# Patient Record
Sex: MALE | Race: WHITE | Employment: FULL TIME | URBAN - NONMETROPOLITAN AREA
[De-identification: names, ages, dates, MRNs, and addresses within clinical notes are randomized per-mention and may not be internally consistent; named-entity substitution may affect disease eponyms.]

---

## 2024-05-17 ENCOUNTER — APPOINTMENT (OUTPATIENT)
Dept: CARDIOLOGY | Facility: HOSPITAL | Age: 59
End: 2024-05-17
Payer: COMMERCIAL

## 2024-05-17 ENCOUNTER — HOSPITAL ENCOUNTER (EMERGENCY)
Facility: HOSPITAL | Age: 59
Discharge: HOME | End: 2024-05-17
Attending: EMERGENCY MEDICINE
Payer: COMMERCIAL

## 2024-05-17 ENCOUNTER — APPOINTMENT (OUTPATIENT)
Dept: RADIOLOGY | Facility: HOSPITAL | Age: 59
End: 2024-05-17
Payer: COMMERCIAL

## 2024-05-17 VITALS
TEMPERATURE: 97.7 F | HEART RATE: 76 BPM | RESPIRATION RATE: 13 BRPM | BODY MASS INDEX: 32.44 KG/M2 | WEIGHT: 219 LBS | OXYGEN SATURATION: 99 % | HEIGHT: 69 IN | DIASTOLIC BLOOD PRESSURE: 81 MMHG | SYSTOLIC BLOOD PRESSURE: 149 MMHG

## 2024-05-17 DIAGNOSIS — R51.9 ACUTE NONINTRACTABLE HEADACHE, UNSPECIFIED HEADACHE TYPE: ICD-10-CM

## 2024-05-17 DIAGNOSIS — R41.0 CONFUSION: Primary | ICD-10-CM

## 2024-05-17 LAB
ALBUMIN SERPL BCP-MCNC: 4.7 G/DL (ref 3.4–5)
ALP SERPL-CCNC: 78 U/L (ref 33–136)
ALT SERPL W P-5'-P-CCNC: 31 U/L (ref 10–52)
ANION GAP SERPL CALC-SCNC: 12 MMOL/L (ref 10–20)
AST SERPL W P-5'-P-CCNC: 26 U/L (ref 9–39)
BASOPHILS # BLD AUTO: 0.01 X10*3/UL (ref 0–0.1)
BASOPHILS NFR BLD AUTO: 0.2 %
BILIRUB SERPL-MCNC: 0.6 MG/DL (ref 0–1.2)
BUN SERPL-MCNC: 13 MG/DL (ref 6–23)
CALCIUM SERPL-MCNC: 9.6 MG/DL (ref 8.6–10.3)
CARDIAC TROPONIN I PNL SERPL HS: 9 NG/L (ref 0–20)
CHLORIDE SERPL-SCNC: 106 MMOL/L (ref 98–107)
CO2 SERPL-SCNC: 24 MMOL/L (ref 21–32)
CREAT SERPL-MCNC: 0.69 MG/DL (ref 0.5–1.3)
EGFRCR SERPLBLD CKD-EPI 2021: >90 ML/MIN/1.73M*2
EOSINOPHIL # BLD AUTO: 0 X10*3/UL (ref 0–0.7)
EOSINOPHIL NFR BLD AUTO: 0 %
ERYTHROCYTE [DISTWIDTH] IN BLOOD BY AUTOMATED COUNT: 12.6 % (ref 11.5–14.5)
GLUCOSE SERPL-MCNC: 117 MG/DL (ref 74–99)
HCT VFR BLD AUTO: 46.1 % (ref 41–52)
HGB BLD-MCNC: 15.6 G/DL (ref 13.5–17.5)
IMM GRANULOCYTES # BLD AUTO: 0.01 X10*3/UL (ref 0–0.7)
IMM GRANULOCYTES NFR BLD AUTO: 0.2 % (ref 0–0.9)
LYMPHOCYTES # BLD AUTO: 1.13 X10*3/UL (ref 1.2–4.8)
LYMPHOCYTES NFR BLD AUTO: 24.4 %
MCH RBC QN AUTO: 30.4 PG (ref 26–34)
MCHC RBC AUTO-ENTMCNC: 33.8 G/DL (ref 32–36)
MCV RBC AUTO: 90 FL (ref 80–100)
MONOCYTES # BLD AUTO: 0.38 X10*3/UL (ref 0.1–1)
MONOCYTES NFR BLD AUTO: 8.2 %
NEUTROPHILS # BLD AUTO: 3.1 X10*3/UL (ref 1.2–7.7)
NEUTROPHILS NFR BLD AUTO: 67 %
NRBC BLD-RTO: 0 /100 WBCS (ref 0–0)
PLATELET # BLD AUTO: 177 X10*3/UL (ref 150–450)
POTASSIUM SERPL-SCNC: 4.1 MMOL/L (ref 3.5–5.3)
PROT SERPL-MCNC: 6.9 G/DL (ref 6.4–8.2)
RBC # BLD AUTO: 5.14 X10*6/UL (ref 4.5–5.9)
SODIUM SERPL-SCNC: 138 MMOL/L (ref 136–145)
WBC # BLD AUTO: 4.6 X10*3/UL (ref 4.4–11.3)

## 2024-05-17 PROCEDURE — 36415 COLL VENOUS BLD VENIPUNCTURE: CPT | Performed by: EMERGENCY MEDICINE

## 2024-05-17 PROCEDURE — 84075 ASSAY ALKALINE PHOSPHATASE: CPT | Performed by: EMERGENCY MEDICINE

## 2024-05-17 PROCEDURE — 99285 EMERGENCY DEPT VISIT HI MDM: CPT | Mod: 25

## 2024-05-17 PROCEDURE — 2500000004 HC RX 250 GENERAL PHARMACY W/ HCPCS (ALT 636 FOR OP/ED): Performed by: EMERGENCY MEDICINE

## 2024-05-17 PROCEDURE — 70450 CT HEAD/BRAIN W/O DYE: CPT

## 2024-05-17 PROCEDURE — 96360 HYDRATION IV INFUSION INIT: CPT

## 2024-05-17 PROCEDURE — 85025 COMPLETE CBC W/AUTO DIFF WBC: CPT | Performed by: EMERGENCY MEDICINE

## 2024-05-17 PROCEDURE — 84484 ASSAY OF TROPONIN QUANT: CPT | Performed by: EMERGENCY MEDICINE

## 2024-05-17 PROCEDURE — 93005 ELECTROCARDIOGRAM TRACING: CPT

## 2024-05-17 PROCEDURE — 70450 CT HEAD/BRAIN W/O DYE: CPT | Performed by: RADIOLOGY

## 2024-05-17 RX ADMIN — SODIUM CHLORIDE 1000 ML: 9 INJECTION, SOLUTION INTRAVENOUS at 10:25

## 2024-05-17 ASSESSMENT — PAIN SCALES - GENERAL: PAINLEVEL_OUTOF10: 0 - NO PAIN

## 2024-05-17 ASSESSMENT — COLUMBIA-SUICIDE SEVERITY RATING SCALE - C-SSRS
1. IN THE PAST MONTH, HAVE YOU WISHED YOU WERE DEAD OR WISHED YOU COULD GO TO SLEEP AND NOT WAKE UP?: NO
2. HAVE YOU ACTUALLY HAD ANY THOUGHTS OF KILLING YOURSELF?: NO
6. HAVE YOU EVER DONE ANYTHING, STARTED TO DO ANYTHING, OR PREPARED TO DO ANYTHING TO END YOUR LIFE?: NO

## 2024-05-17 ASSESSMENT — PAIN - FUNCTIONAL ASSESSMENT: PAIN_FUNCTIONAL_ASSESSMENT: 0-10

## 2024-05-17 NOTE — ED PROVIDER NOTES
"HPI   Chief Complaint   Patient presents with    episode where he \"spced out\" for a few moments      Pt reports he had brain surgery 30 yrs ago for epilepsy (has not been on meds and has had no seizure since surgery) pt reports yesterday he was talking to his wife and had an episode where he \"spaced out\" pt reports he has had these episodes before when he is under stress. Patient denies any weakness, dizziness or visual changes, pt does report pressure in his frontal lobe        HPI  Patient is a 59-year-old male presenting to the ED today for episodes of confusion that he has had for years.  Patient explains that he had brain surgery 30 years ago for epilepsy.  He does not follow with a neurologist.  Patient states that for the last several years, he has intermittent episodes where he gets confused when someone is talking to him, particularly when they are giving him directions.  He explains that he \"spaces out,\" and has trouble understanding/interpreting the directions.  After a while, he is able to process them and it comes back to him. He notes that with these episodes, he also sometimes gets pressure in the front of his forehead, that is improved when he applies pressure to the area.  He notes that a similar episode occurred yesterday when he was talking to his wife, prompting him to come to the ED today for further evaluation.  He did call his PCP yesterday for an appointment but is not scheduled to go in for another month.  Currently, patient is asymptomatic.  He otherwise denies any focal numbness, weakness, tingling.  He denies any chest pain, vision changes, shortness of breath, vertigo, dizziness.                  [place] Coma Scale Score: 15         NIH Stroke Scale: 0             Patient History   History reviewed. No pertinent past medical history.  History reviewed. No pertinent surgical history.  No family history on file.  Social History     Tobacco Use    Smoking status: Never    Smokeless tobacco: " Never   Vaping Use    Vaping status: Never Used   Substance Use Topics    Alcohol use: Not Currently    Drug use: Not Currently       Physical Exam   ED Triage Vitals   Temperature Heart Rate Respirations BP   05/17/24 1001 05/17/24 1000 05/17/24 1000 05/17/24 1001   36.5 °C (97.7 °F) 79 19 135/88      Pulse Ox Temp Source Heart Rate Source Patient Position   05/17/24 1000 05/17/24 1001 05/17/24 1001 --   99 % Oral Monitor       BP Location FiO2 (%)     -- --             Physical Exam  Vitals and nursing note reviewed.   Constitutional:       General: He is not in acute distress.  HENT:      Head: Normocephalic.      Mouth/Throat:      Mouth: Mucous membranes are moist.   Eyes:      Extraocular Movements: Extraocular movements intact.      Conjunctiva/sclera: Conjunctivae normal.   Cardiovascular:      Rate and Rhythm: Normal rate and regular rhythm.   Pulmonary:      Effort: Pulmonary effort is normal. No respiratory distress.   Abdominal:      General: There is no distension.      Palpations: Abdomen is soft.      Tenderness: There is no abdominal tenderness.   Musculoskeletal:         General: No swelling.      Cervical back: Neck supple.   Skin:     General: Skin is warm and dry.      Capillary Refill: Capillary refill takes less than 2 seconds.   Neurological:      General: No focal deficit present.      Mental Status: He is alert. Mental status is at baseline.      Comments: This patient is awake, alert and oriented to person, place and time. Speech is clear and fluent. Cranial nerves II-XII are grossly intact. Strength and sensation are intact in all extremities. No limb ataxia. No pronator drift.          ED Course & MDM   ED Course as of 05/17/24 1336   Fri May 17, 2024   1044 EKG obtained at 0942, interpreted by myself.  Normal sinus rhythm with a ventricular rate of 80, no axis deviation, normal intervals, with no acute ischemic changes [VT]      ED Course User Index  [VT] Jennifer TUCKER MD          Diagnoses as of 05/17/24 1336   Confusion   Acute nonintractable headache, unspecified headache type       Medical Decision Making  Patient was seen and evaluated for intermittent episodes of headache and confusion that he has had for the past several years now.  At this time, patient is asymptomatic.  On exam, patient has no focal neurological deficits.  Basic lab work and imaging ordered for further evaluation of the patient's symptoms.    CBC, CMP, and troponin are unremarkable.  CT head wo IV contrast   Final Result   No acute intracranial pathology.             Signed by: Vu Wright 5/17/2024 11:00 AM   Dictation workstation:   [id number]        On reevaluation, patient is resting comfortably in bed.  He has remained asymptomatic since being here in the ED. Patient was informed of their lab and imaging results, and all questions and concerns were answered. Discharge planning with close outpatient follow-up was discussed at this time, to which the patient was agreeable. Strict return precautions were given, and patient was discharged home in stable condition.    Procedure  Procedures     Jennifer TUCKER MD  05/17/24 1714

## 2024-05-22 LAB
ATRIAL RATE: 80 BPM
P AXIS: 45 DEGREES
P OFFSET: 210 MS
P ONSET: 155 MS
PR INTERVAL: 130 MS
Q ONSET: 220 MS
QRS COUNT: 13 BEATS
QRS DURATION: 98 MS
QT INTERVAL: 388 MS
QTC CALCULATION(BAZETT): 447 MS
QTC FREDERICIA: 427 MS
R AXIS: 46 DEGREES
T AXIS: 20 DEGREES
T OFFSET: 414 MS
VENTRICULAR RATE: 80 BPM